# Patient Record
Sex: FEMALE | URBAN - METROPOLITAN AREA
[De-identification: names, ages, dates, MRNs, and addresses within clinical notes are randomized per-mention and may not be internally consistent; named-entity substitution may affect disease eponyms.]

---

## 2018-05-11 ENCOUNTER — APPOINTMENT (RX ONLY)
Dept: URBAN - METROPOLITAN AREA CLINIC 23 | Facility: CLINIC | Age: 48
Setting detail: DERMATOLOGY
End: 2018-05-11

## 2018-05-11 DIAGNOSIS — Z41.9 ENCOUNTER FOR PROCEDURE FOR PURPOSES OTHER THAN REMEDYING HEALTH STATE, UNSPECIFIED: ICD-10-CM

## 2018-05-11 PROCEDURE — ? MICRONEEDLING

## 2018-05-11 ASSESSMENT — LOCATION ZONE DERM: LOCATION ZONE: FACE

## 2018-05-11 ASSESSMENT — LOCATION DETAILED DESCRIPTION DERM: LOCATION DETAILED: LEFT MEDIAL MALAR CHEEK

## 2018-05-11 ASSESSMENT — LOCATION SIMPLE DESCRIPTION DERM: LOCATION SIMPLE: LEFT CHEEK

## 2018-05-11 NOTE — PROCEDURE: MICRONEEDLING
Post-Care Instructions: After the procedure, take precautions agains sun exposure. Do not apply sunscreen for 12 hours after the procedure. Do not apply make-up for 12 hours after the procedure. Avoid alcohol based toners for 10-14 days. After 2-3 days patients can return to their regular skin regimen.
Depth In Mm: 0.1
Treatment Number (Optional): 1
Length Of Topical Anesthesia Application (Optional): 15 minutes
Detail Level: Zone
Location #1: face
Depth In Mm: 0.2
Consent: Written consent obtained, risks reviewed including but not limited to pain, scarring, infection and incomplete improvement. Patient understands the procedure is cosmetic in nature and will require out of pocket payment.
Topical Anesthesia?: 15% lidocaine, 5% prilocaine

## 2018-05-31 ENCOUNTER — APPOINTMENT (RX ONLY)
Dept: URBAN - METROPOLITAN AREA CLINIC 23 | Facility: CLINIC | Age: 48
Setting detail: DERMATOLOGY
End: 2018-05-31

## 2018-05-31 DIAGNOSIS — Z41.9 ENCOUNTER FOR PROCEDURE FOR PURPOSES OTHER THAN REMEDYING HEALTH STATE, UNSPECIFIED: ICD-10-CM

## 2018-05-31 PROCEDURE — ? RECOMMENDATIONS

## 2018-05-31 PROCEDURE — ? PRESCRIPTION

## 2018-05-31 PROCEDURE — ? MICRONEEDLING

## 2018-05-31 RX ORDER — OXYMETAZOLINE HYDROCHLORIDE 10 MG/G
CREAM TOPICAL
Qty: 1 | Refills: 0 | Status: ERX | COMMUNITY
Start: 2018-05-31

## 2018-05-31 RX ORDER — AZELAIC ACID 0.15 G/G
AEROSOL, FOAM TOPICAL
Qty: 1 | Refills: 2 | Status: ERX | COMMUNITY
Start: 2018-05-31

## 2018-05-31 RX ADMIN — AZELAIC ACID 1: 0.15 AEROSOL, FOAM TOPICAL at 00:00

## 2018-05-31 RX ADMIN — OXYMETAZOLINE HYDROCHLORIDE 1: 10 CREAM TOPICAL at 00:00

## 2018-05-31 NOTE — PROCEDURE: RECOMMENDATIONS
Recommendations (Free Text): Finacea foam and Rofade
Detail Level: Zone
Recommendation Preamble: Recommended

## 2018-05-31 NOTE — HPI: COSMETIC (MICRONEEDLING)
Have You Had Microneedling Treatments Before?: has had a previous microneedling treatment
Additional History: Creamed patient with Lido/tetra 23/7%@ 9:45AM

## 2018-05-31 NOTE — PROCEDURE: MICRONEEDLING
Detail Level: Zone
Treatment Number (Optional): 0
Depth In Mm: 0.2
Depth In Mm: 0.1
Infusions (Optional): growth factor
Post-Care Instructions: After the procedure, take precautions agains sun exposure. Do not apply sunscreen for 12 hours after the procedure. Do not apply make-up for 12 hours after the procedure. Avoid alcohol based toners for 10-14 days. After 2-3 days patients can return to their regular skin regimen.
Location #1: full face
Consent: Written consent obtained, risks reviewed including but not limited to pain, scarring, infection and incomplete improvement. Patient understands the procedure is cosmetic in nature and will require out of pocket payment.

## 2018-10-18 ENCOUNTER — APPOINTMENT (RX ONLY)
Dept: URBAN - METROPOLITAN AREA CLINIC 23 | Facility: CLINIC | Age: 48
Setting detail: DERMATOLOGY
End: 2018-10-18

## 2018-10-18 DIAGNOSIS — Z41.9 ENCOUNTER FOR PROCEDURE FOR PURPOSES OTHER THAN REMEDYING HEALTH STATE, UNSPECIFIED: ICD-10-CM

## 2018-10-18 PROCEDURE — ? MICRONEEDLING

## 2018-10-18 NOTE — PROCEDURE: MICRONEEDLING
Depth In Mm: 0.1
Infusions (Optional): growth factor
Detail Level: Zone
Depth In Mm: 0.25
Post-Care Instructions: After the procedure, take precautions agains sun exposure. Do not apply sunscreen for 12 hours after the procedure. Do not apply make-up for 12 hours after the procedure. Avoid alcohol based toners for 10-14 days. After 2-3 days patients can return to their regular skin regimen.
Treatment Number (Optional): 0
Consent: Written consent obtained, risks reviewed including but not limited to pain, scarring, infection and incomplete improvement. Patient understands the procedure is cosmetic in nature and will require out of pocket payment.
78

## 2018-11-08 ENCOUNTER — APPOINTMENT (RX ONLY)
Dept: URBAN - METROPOLITAN AREA CLINIC 23 | Facility: CLINIC | Age: 48
Setting detail: DERMATOLOGY
End: 2018-11-08

## 2018-11-08 DIAGNOSIS — Z41.9 ENCOUNTER FOR PROCEDURE FOR PURPOSES OTHER THAN REMEDYING HEALTH STATE, UNSPECIFIED: ICD-10-CM

## 2018-11-08 PROCEDURE — ? FILLERS

## 2018-11-08 PROCEDURE — ? DYSPORT

## 2018-11-08 NOTE — PROCEDURE: DYSPORT
Nasal Root Units: 0
Dilution (U/ 0.1cc): 1.5
Additional Area 4 Location: [facility]
Detail Level: Detailed
Additional Area 1 Location: crows feet
Additional Area 1 Units: [address] [facility]
Additional Area 2 Location: high forehead 2.1 cm above brows
Additional Comments: special
Consent: Written consent obtained. Risks include but not limited to lid/brow ptosis, bruising, swelling, diplopia, temporary effect, incomplete chemical denervation.
Post-Care Instructions: Patient instructed to not lie down for 4 hours, limit physical activity for 24 hours and avoid manipulation of the treated areas.
Expiration Date (Month Year): 12/31/2018
Lot #: [id number]
Additional Area 3 Location: Neckbands
Expiration Date (Month Year): 06/30/2019
Lot #: [id number]
Additional Area 3 Location: lateral brows
Additional Area 3 Units: 10
Glabellar Complex Units: [address]
Additional Area 4 Location: [facility]
Forehead Units: [address]

## 2018-11-08 NOTE — PROCEDURE: FILLERS
Additional Area 4 Location: right tear though
Anesthesia Volume In Cc: 0.5
Jawline Filler Volume In Cc: 0
Additional Area 1 Location: lateral jawline, lateral cheeks, marionette lines.
Consent: Written consent obtained. Risks include but not limited to bruising, beading, irregular texture, ulceration, infection, allergic reaction, scar formation, incomplete augmentation, temporary nature, procedural pain.
Map Statment: See [address] for Complete Details
Use Map Statement For Sites (Optional): No
Include Cannula Length?: 1.5 inch
Expiration Date (Month Year): 10/29/2019
Cheeks Filler Volume In Cc: 1
Additional Area 2 Location: lateral jawline, Left marionette
Include Cannula Brand?: DermaSculpt
Price (Use Numbers Only, No Special Characters Or $): 0.00
Additional Area 3 Location: lateral jaw
Additional Area 1 Location: upper lips and using the cannula to tear troughs
Additional Area 2 Location: cheeks and vermilion boarder
Include Cannula Size?: 25G
Anesthesia Type: 1% lidocaine with epinephrine
Lot #: [id number]
Lot #: [id number]
Detail Level: Zone
Filler: Voluma
Post-Care Instructions: Patient instructed to apply ice to reduce swelling.
Additional Anesthesia Volume In Cc: 6
Additional Area 3 Location: jawline
Expiration Date (Month Year): 12/19/2019
Additional Area 5 Location: chin

## 2018-11-15 ENCOUNTER — APPOINTMENT (RX ONLY)
Dept: URBAN - METROPOLITAN AREA CLINIC 23 | Facility: CLINIC | Age: 48
Setting detail: DERMATOLOGY
End: 2018-11-15

## 2018-11-15 DIAGNOSIS — Z41.9 ENCOUNTER FOR PROCEDURE FOR PURPOSES OTHER THAN REMEDYING HEALTH STATE, UNSPECIFIED: ICD-10-CM

## 2018-11-15 PROCEDURE — ? FILLERS

## 2018-11-15 PROCEDURE — ? HYALURONIDASE INJECTION

## 2018-11-15 PROCEDURE — ? DIAGNOSIS COMMENT

## 2018-11-15 PROCEDURE — ? PULSED-DYE LASER

## 2018-11-15 PROCEDURE — ? BOTOX

## 2018-11-15 ASSESSMENT — LOCATION ZONE DERM: LOCATION ZONE: FACE

## 2018-11-15 ASSESSMENT — LOCATION SIMPLE DESCRIPTION DERM: LOCATION SIMPLE: RIGHT CHEEK

## 2018-11-15 ASSESSMENT — LOCATION DETAILED DESCRIPTION DERM: LOCATION DETAILED: RIGHT SUPERIOR LATERAL MALAR CHEEK

## 2018-11-15 NOTE — PROCEDURE: PULSED-DYE LASER
Delay Time (Ms): [address] [facility]
Spot Size: 7 mm
Delay Time (Ms): 20
Fluence In J/Cm2 (Optional): 6.50
Cryogen Time (Ms): [address] [address]
Location Override: chin
Cryogen Time (Ms): 30
Consent: Written consent obtained, risks reviewed including but not limited to crusting, scabbing, blistering, scarring, darker or lighter pigmentary change, incidental hair removal, bruising, and/or incomplete removal.
Pulse Duration: 6 ms
Pulse Duration: 10 ms
Treated Area: small area
Pulse Duration: 40 ms
Post-Procedure Care: Vaseline and ice applied.  Pos
Spot Size: 10x3 mm
Post-Care Instructions: I reviewed with the patient in detail post-care instructions. Patient should stay away from the sun and wear sun protection until treated areas are fully healed.
Spot Size: 10 mm
Detail Level: Zone
Laser Type: Vbeam 595nm

## 2018-11-15 NOTE — PROCEDURE: HYALURONIDASE INJECTION
Administered By (Optional): Dr. Yael Bolden
Filler Previously Used (Optional): Restylane
Expiration Date (Optional): 05/2020
Hyaluronidase Preparation: hyaluronidase 150 USP units/ml
Total Volume (Ccs): 0.6
Treatment Number (Optional): 1
Lot # (Optional): [id number]
Consent: The risks of contour defects and dimpling of the skin were reviewed with the patient prior to the injection.
Detail Level: Simple

## 2018-11-15 NOTE — PROCEDURE: FILLERS
Additional Area 4 Volume In Cc: 0
Filler: Restylane
Include Cannula Information In Note?: No
Additional Area 1 Location: upper lips and using the cannula to tear troughs
Additional Area 2 Location: lower lip
Additional Area 3 Volume In Cc: 1
Consent: Written consent obtained. Risks include but not limited to bruising, beading, irregular texture, ulceration, infection, allergic reaction, scar formation, incomplete augmentation, temporary nature, procedural pain.
Include Cannula Brand?: DermaSculpt
Include Cannula Length?: 1.5 inch
Anesthesia Type: 1% lidocaine with epinephrine
Additional Area 3 Location: chin,cheeks
Additional Area 2 Location: lateral jawline, Left marionette
Price (Use Numbers Only, No Special Characters Or $): 0.00
Lot #: 
Lot #: [id number]
Additional Area 1 Location: Nasalabial folds
Additional Anesthesia Volume In Cc: 6
Expiration Date (Month Year): 01/31/2021
Additional Area 3 Location: lateral jaw
Map Statment: See [address] for Complete Details
Include Cannula Size?: 25G
Detail Level: Zone
Anesthesia Volume In Cc: 0.5
Expiration Date (Month Year): 06/2020
Post-Care Instructions: Patient instructed to apply ice to reduce swelling.
Additional Area 5 Location: chin
Additional Area 4 Location: right tear though

## 2018-11-15 NOTE — PROCEDURE: BOTOX
Additional Area 2 Location: high forehead
Glabellar Complex Units: 0
Additional Area 6 Location: right lower lip
Detail Level: Zone
Expiration Date (Month Year): 12/2020
Post-Care Instructions: Patient instructed to not lie down for 4 hours and limit physical activity for 24 hours. Patient instructed not to travel by airplane for 48 hours.
Additional Area 3 Units: 1
Additional Area 1 Location: Crows feet
Additional Area 4 Location: neck bands
Dilution (U/0.1 Cc): 2.5
Consent: Written consent obtained. Risks include but not limited to lid/brow ptosis, bruising, swelling, diplopia, temporary effect, incomplete chemical denervation.
Additional Area 3 Location: left  Eyebrow
Lot #: [id number]
Additional Area 5 Location: upper lip cutaneous

## 2018-11-16 ENCOUNTER — APPOINTMENT (RX ONLY)
Dept: URBAN - METROPOLITAN AREA CLINIC 23 | Facility: CLINIC | Age: 48
Setting detail: DERMATOLOGY
End: 2018-11-16

## 2018-11-16 DIAGNOSIS — Z41.9 ENCOUNTER FOR PROCEDURE FOR PURPOSES OTHER THAN REMEDYING HEALTH STATE, UNSPECIFIED: ICD-10-CM

## 2018-11-16 PROCEDURE — ? PULSED-DYE LASER

## 2018-11-16 PROCEDURE — ? IN-HOUSE DISPENSING PHARMACY

## 2018-11-16 PROCEDURE — ? DIAGNOSIS COMMENT

## 2018-11-16 NOTE — PROCEDURE: PULSED-DYE LASER
Spot Size: 7 mm
Pulse Duration: 10 ms
Delay Time (Ms): [address] [facility]
Treated Area: small area
Cryogen Time (Ms): 27
Laser Type: Vbeam 595nm
Delay Time (Ms): 20
Consent: Written consent obtained, risks reviewed including but not limited to crusting, scabbing, blistering, scarring, darker or lighter pigmentary change, incidental hair removal, bruising, and/or incomplete removal.
Spot Size: 10x3 mm
Cryogen Time (Ms): [address] [address]
Fluence In J/Cm2 (Optional): 6.50
Location Override: chin
Pulse Duration: 40 ms
Cryogen Time (Ms): 30
Detail Level: Zone
Post-Procedure Care: Vaseline and ice applied.  Pos
Post-Care Instructions: I reviewed with the patient in detail post-care instructions. Patient should stay away from the sun and wear sun protection until treated areas are fully healed.
Pulse Duration: 6 ms
Spot Size: 10 mm

## 2018-11-16 NOTE — PROCEDURE: IN-HOUSE DISPENSING PHARMACY
Product 5 Units Dispensed: 0
Product 8 Unit Type: ml
Product 52 Unit Type: mg
Product 6 Refills: 1
Name Of Product 8: Prednisone 20mg
Name Of Product 5: Hydroquinone 4% / Tretinoin 0.05% / Fluocinolone 0.01%
Name Of Product 9: Prednisone 10mg
Name Of Product 6: Tretinoin 0.05% cream
Product 12 Application Directions: Apply to the entire face in the Am and Pm
Product 10 Amount/Unit (Numbers Only): 10
Product 4 Application Directions: Take one tablet Day # 2 and one tablet day # 3 as indicated and resume.
Name Of Product 12: Skin Better Even tone Serum
Product 5 Unit Type: tube(s)
Product 3 Application Directions: Take 1 tablet today for swelling as indicated today in office.  Take another one tomorrow
Render Refills If Set To 0: No
Product 9 Amount/Unit (Numbers Only): 2
Product 7 Unit Type: bottle(s)
Product 7 Application Directions: Apply to each eye lashes at bedtime
Name Of Product 10: Cetirizine HCL- Antihistamine
Product 1 Amount/Unit (Numbers Only): 5
Product 6 Amount/Unit (Numbers Only): [address] [facility] [place]
Product 5 Application Directions: Apply thin layer to skin face area at bedtime every other day
Product 2 Unit Type: jar(s)
Name Of Product 1: Valium
Name Of Product 11: Glycolic acid pads
Product 10 Application Directions: Take one tablet now in the office
Product 8 Application Directions: Apply to eyelashes nightly
Name Of Product 4: Prednisone 10 mg
Product 3 Unit Type: tablets
Product 8 Amount/Unit (Numbers Only): 3
Detail Level: Zone
Product 1 Price/Unit (In Dollars): 0.00
Product 6 Application Directions: Apply pea size amount to entire face at bedtime only
Name Of Product 2: Hydroquinone pads 6%
Product 9 Application Directions: Take 1 tablet now in office
Product 6 Unit Type: grams
Name Of Product 7: Latisse 5ml
Product 2 Application Directions: Apply 1 pad to face in the AM

## 2019-02-06 ENCOUNTER — APPOINTMENT (RX ONLY)
Dept: URBAN - METROPOLITAN AREA CLINIC 23 | Facility: CLINIC | Age: 49
Setting detail: DERMATOLOGY
End: 2019-02-06

## 2019-02-06 DIAGNOSIS — Z41.9 ENCOUNTER FOR PROCEDURE FOR PURPOSES OTHER THAN REMEDYING HEALTH STATE, UNSPECIFIED: ICD-10-CM

## 2019-02-06 PROCEDURE — ? IN-HOUSE DISPENSING PHARMACY

## 2019-02-06 PROCEDURE — ? DYSPORT

## 2019-02-06 PROCEDURE — ? PULSED-DYE LASER

## 2019-02-06 PROCEDURE — ? FILLERS

## 2019-02-06 NOTE — PROCEDURE: DYSPORT
Glabellar Complex Units: 0
Additional Area 2 Location: lateral brows
Consent: Written consent obtained. Risks include but not limited to lid/brow ptosis, bruising, swelling, diplopia, temporary effect, incomplete chemical denervation.
Additional Area 5 Location: chin
Lot #: [id number]
Expiration Date (Month Year): 08/31/2019
Additional Area 1 Location: forehead 2cm above brows
Detail Level: Detailed
Post-Care Instructions: Patient instructed to not lie down for 4 hours, limit physical activity for 24 hours and avoid manipulation of the treated areas.
Additional Area 6 Location: right axilla left axilla
Forehead Units: [address] [address]
Dilution (U/ 0.1cc): 1.5

## 2019-02-06 NOTE — PROCEDURE: FILLERS
Decollete Filler  Volume In Cc: 0
Additional Anesthesia Volume In Cc: 6
Include Cannula Brand?: DermaSculpt
Lot #: [id number]
Additional Area 1 Location: cheeks,temples,medial cheeks
Use Map Statement For Sites (Optional): No
Additional Area 1 Volume In Cc: 1
Include Cannula Size?: 25G
Expiration Date (Month Year): 05/31/2021
Additional Area 2 Location: NLF, Lips
Map Statment: See [address] for Complete Details
Lot #: [id number]
Include Cannula Length?: 1.5 inch
Additional Area 3 Location: Temples with a cannula
Consent: Written consent obtained. Risks include but not limited to bruising, beading, irregular texture, ulceration, infection, allergic reaction, scar formation, incomplete augmentation, temporary nature, procedural pain.
Post-Care Instructions: Patient instructed to apply ice to reduce swelling.
Filler: Jolynn German
Lot #: [id number]
Additional Area 4 Location: lateral Temples with Cannula, T
Include Cannula Information In Note?: Yes
Additional Area 1 Location: chin
Additional Area 5 Location: marionettes, perioral lines
Additional Area 1 Location: left dorsal hand using an acannula
Additional Area 2 Location: lateral jawline, Left marionette
Detail Level: Zone
Anesthesia Type: 1% lidocaine with epinephrine
Lot #: [id number]
Additional Area 1 Location: left dorsal hand
Additional Area 3 Location: lateral jaw
Anesthesia Volume In Cc: 0.5
Expiration Date (Month Year): 2021-05-31
Additional Area 4 Location: Left Cheek, Left nasalabial fold
Price (Use Numbers Only, No Special Characters Or $): 0.00

## 2019-02-06 NOTE — PROCEDURE: IN-HOUSE DISPENSING PHARMACY
Name Of Product 6: Tretinoin 0.05% cream
Product 11 Units Dispensed: 0
Product 63 Unit Type: mg
Product 8 Unit Type: ml
Product 12 Amount/Unit (Numbers Only): 1
Name Of Product 14: Valtrex 500mg
Product 11 Application Directions: Apply to the affected area of the face daily
Name Of Product 3: Prednisone 20 mg
Name Of Product 9: Prednisone 10mg
Product 2 Unit Type: jar(s)
Product 6 Application Directions: Apply pea size amount to entire face at bedtime only
Name Of Product 1: Valtrex
Product 3 Unit Type: tablets
Product 5 Application Directions: Apply thin layer to skin face area at bedtime every other day
Product 14 Amount/Unit (Numbers Only): [address]
Product 13 Application Directions: Wash the face in the AM and PM
Product 9 Amount/Unit (Numbers Only): 2
Name Of Product 11: Exfoliate Glycolic pads
Product 8 Application Directions: Apply to eyelashes nightly
Product 3 Application Directions: Take 1 tablet today for swelling as indicated today in office.
Product 5 Unit Type: tube(s)
Product 13 Unit Type: bottle(s)
Name Of Product 10: Cetirizine HCL- Antihistamine
Product 4 Application Directions: Take 1 tablet tomorrow for swelling as indicated.
Product 7 Application Directions: Apply to each eye lashes at bedtime
Send Charges To Patient Encounter: No
Name Of Product 5: Hydroquinone 4% / Tretinoin 0.05% / Fluocinolone 0.01%
Name Of Product 13: Exfoliate Glycolic Cleanser
Product 10 Application Directions: Take one tablet now in the office
Name Of Product 8: Prednisone 20mg
Product 6 Amount/Unit (Numbers Only): [address] [facility] [place]
Product 10 Amount/Unit (Numbers Only): 10
Product 14 Application Directions: Take one tablet in the Am and Pm x 3 days
Product 6 Unit Type: grams
Name Of Product 12: Skin Better Even tone Serum
Product 2 Application Directions: Apply pad to face in the am and pm as indicated
Product 9 Application Directions: Take 1 tablet now in office
Name Of Product 7: Latisse 5ml
Product 1 Price/Unit (In Dollars): 0.00
Product 8 Amount/Unit (Numbers Only): 3
Name Of Product 2: Hydroquinone pads 6%
Product 12 Application Directions: Apply to the entire face in the Am and Pm
Detail Level: Zone

## 2019-02-06 NOTE — PROCEDURE: PULSED-DYE LASER
Fluence In J/Cm2 (Optional): 11.00
Spot Size: 10 mm
Delay Time (Ms): [address] [facility], [place]
Cryogen Time (Ms): [address] [address]
Pulse Duration: 6 ms
Post-Care Instructions: I reviewed with the patient in detail post-care instructions. Patient should stay away from the sun and wear sun protection until treated areas are fully healed.
Location Override: bruising post cos.
Pulse Duration: 10 ms
Cryogen Time (Ms): 30
Delay Time (Ms): 20
Cryogen Time (Ms): [address]
Laser Type: Vbeam 595nm
Spot Size: 7 mm
Consent: Written consent obtained, risks reviewed including but not limited to crusting, scabbing, blistering, scarring, darker or lighter pigmentary change, incidental hair removal, bruising, and/or incomplete removal.
Treated Area: large area
Treated Area: small area
Fluence In J/Cm2 (Optional): 6.50
Detail Level: Zone
Delay Time (Ms): 40

## 2019-02-06 NOTE — PROCEDURE: MIPS QUALITY
Quality 110: Preventive Care And Screening: Influenza Immunization: Influenza Immunization not Administered for Documented Reasons.
Quality 474: Zoster Vaccination Status: Shingrix Vaccination not Administered or Previously Received, Reason not Otherwise Specified
Detail Level: Simple
Quality 130: Documentation Of Current Medications In The Medical Record: Current Medications Documented

## 2019-02-21 ENCOUNTER — RX ONLY (OUTPATIENT)
Age: 49
Setting detail: RX ONLY
End: 2019-02-21

## 2019-02-21 ENCOUNTER — APPOINTMENT (RX ONLY)
Dept: URBAN - METROPOLITAN AREA CLINIC 23 | Facility: CLINIC | Age: 49
Setting detail: DERMATOLOGY
End: 2019-02-21

## 2019-02-21 DIAGNOSIS — Z41.9 ENCOUNTER FOR PROCEDURE FOR PURPOSES OTHER THAN REMEDYING HEALTH STATE, UNSPECIFIED: ICD-10-CM

## 2019-02-21 PROCEDURE — ? DYSPORT

## 2019-02-21 RX ORDER — PREDNISONE 10 MG/1
TABLET ORAL
Qty: 6 | Refills: 1 | Status: ERX | COMMUNITY
Start: 2019-02-21

## 2019-02-21 NOTE — PROCEDURE: DYSPORT
Additional Area 1 Units: 0
Detail Level: Detailed
Additional Area 4 Location: high mid forehead
Lot #: [id number]
Expiration Date (Month Year): 08/31/2019
Post-Care Instructions: Patient instructed to not lie down for 4 hours, limit physical activity for 24 hours and avoid manipulation of the treated areas.
Additional Area 3 Location: lateral brows
Additional Area 6 Location: right axilla left axilla
Dilution (U/ 0.1cc): 1.5
Additional Area 2 Location: chin
Additional Area 5 Location: Crows feet
Periorbital Skin Units: 10
Additional Area 1 Location: Glabella touch up
Consent: Written consent obtained. Risks include but not limited to lid/brow ptosis, bruising, swelling, diplopia, temporary effect, incomplete chemical denervation.

## 2019-02-26 ENCOUNTER — APPOINTMENT (RX ONLY)
Dept: URBAN - METROPOLITAN AREA CLINIC 23 | Facility: CLINIC | Age: 49
Setting detail: DERMATOLOGY
End: 2019-02-26

## 2019-02-26 DIAGNOSIS — Z41.9 ENCOUNTER FOR PROCEDURE FOR PURPOSES OTHER THAN REMEDYING HEALTH STATE, UNSPECIFIED: ICD-10-CM

## 2019-02-26 PROCEDURE — ? HYALURONIDASE INJECTION

## 2019-02-26 PROCEDURE — ? DIAGNOSIS COMMENT

## 2019-02-26 PROCEDURE — ? PULSED-DYE LASER

## 2019-02-26 PROCEDURE — ? FILLERS

## 2019-02-26 ASSESSMENT — LOCATION DETAILED DESCRIPTION DERM: LOCATION DETAILED: LEFT CHIN

## 2019-02-26 ASSESSMENT — LOCATION SIMPLE DESCRIPTION DERM: LOCATION SIMPLE: CHIN

## 2019-02-26 ASSESSMENT — LOCATION ZONE DERM: LOCATION ZONE: FACE

## 2019-02-26 NOTE — PROCEDURE: FILLERS
Lateral Face Filler  Volume In Cc: 0
Include Cannula Information In Note?: No
Additional Area 5 Location: NFs, lateral cheeks
Additional Area 4 Location: Left Cheek, Left nasalabial fold
Filler: Restylane Defyne
Lot #: [id number]
Expiration Date (Month Year): 07/31/2020
Lot #: [id number]
Detail Level: Zone
Lot #: [id number]
Expiration Date (Month Year): 6/4/20
Include Cannula Brand?: DermaSculpt
Anesthesia Type: 1% lidocaine with epinephrine
Price (Use Numbers Only, No Special Characters Or $): 0.00
Expiration Date (Month Year): 05/31/2021
Include Cannula Size?: 25G
Include Cannula Information In Note?: Yes
Additional Area 1 Location: cheeks and vermillion lips
Anesthesia Volume In Cc: 0.5
Consent: Written consent obtained. Risks include but not limited to bruising, beading, irregular texture, ulceration, infection, allergic reaction, scar formation, incomplete augmentation, temporary nature, procedural pain.
Additional Area 2 Location: Chin
Include Cannula Length?: 1.5 inch
Post-Care Instructions: Patient instructed to apply ice to reduce swelling.
Additional Area 1 Location: Oral commissure, marionette lines, lips
Additional Area 2 Volume In Cc: 1
Lot #: [id number]
Additional Anesthesia Volume In Cc: 6
Additional Area 3 Location: right hand
Additional Area 2 Location: lateral jawline, Left marionette
Additional Area 1 Location: left dorsal hand using an acannula
Map Statment: See [address] for Complete Details
Additional Area 4 Location: left hand
Additional Area 3 Location: lateral jaw
Additional Area 1 Location: left dorsal hand

## 2019-02-26 NOTE — PROCEDURE: HYALURONIDASE INJECTION
Total Volume (Ccs): 0.5
Detail Level: Simple
Treatment Number (Optional): 1
Lot # (Optional): [id number]
Expiration Date (Optional): 05/2020
Administered By (Optional): Dr. Obi Kunz
Consent: The risks of contour defects and dimpling of the skin were reviewed with the patient prior to the injection.
Hyaluronidase Preparation: hyaluronidase 150 USP units/ml

## 2019-02-26 NOTE — PROCEDURE: PULSED-DYE LASER
Cryogen Time (Ms): [address] [address]
Detail Level: Zone
Treated Area: medium area
Spot Size: 10 mm
Delay Time (Ms): [address] [facility]
Delay Time (Ms): [address]
Spot Size: 7 mm
Cryogen Time (Ms): 40
Pulse Duration: 10 ms
Location (Required For Details To Render In Note But Body Touch Will Also Count For First Location): chin
Consent: Written consent obtained, risks reviewed including but not limited to crusting, scabbing, blistering, scarring, darker or lighter pigmentary change, incidental hair removal, bruising, and/or incomplete removal.
Treated Area: large area
Cryogen Time (Ms): 30
Fluence In J/Cm2 (Optional): 6.50
Delay Time (Ms): 20
Pulse Duration: 6 ms
Laser Type: Vbeam 595nm
Post-Care Instructions: I reviewed with the patient in detail post-care instructions. Patient should stay away from the sun and wear sun protection until treated areas are fully healed.

## 2019-04-18 ENCOUNTER — APPOINTMENT (RX ONLY)
Dept: URBAN - METROPOLITAN AREA CLINIC 23 | Facility: CLINIC | Age: 49
Setting detail: DERMATOLOGY
End: 2019-04-18

## 2019-04-18 DIAGNOSIS — Z41.9 ENCOUNTER FOR PROCEDURE FOR PURPOSES OTHER THAN REMEDYING HEALTH STATE, UNSPECIFIED: ICD-10-CM

## 2019-04-18 DIAGNOSIS — D485 NEOPLASM OF UNCERTAIN BEHAVIOR OF SKIN: ICD-10-CM

## 2019-04-18 PROBLEM — D48.5 NEOPLASM OF UNCERTAIN BEHAVIOR OF SKIN: Status: ACTIVE | Noted: 2019-04-18

## 2019-04-18 PROCEDURE — ? ADDITIONAL NOTES

## 2019-04-18 PROCEDURE — ? BOTOX

## 2019-04-18 PROCEDURE — ? BIOPSY BY SHAVE METHOD

## 2019-04-18 PROCEDURE — 11102 TANGNTL BX SKIN SINGLE LES: CPT

## 2019-04-18 ASSESSMENT — LOCATION DETAILED DESCRIPTION DERM: LOCATION DETAILED: LEFT MEDIAL SUPERIOR CHEST

## 2019-04-18 ASSESSMENT — LOCATION SIMPLE DESCRIPTION DERM: LOCATION SIMPLE: CHEST

## 2019-04-18 ASSESSMENT — LOCATION ZONE DERM: LOCATION ZONE: TRUNK

## 2019-04-18 NOTE — PROCEDURE: BOTOX
Expiration Date (Month Year): 08/2021
Levator Labii Superioris Units: 0
Additional Area 6 Location: [facility]
Additional Area 4 Location: bunnies lines
Additional Area 1 Units: 4
Additional Area 1 Location: chin
Lot #: [id number]
Dilution (U/0.1 Cc): 2.5
Detail Level: Zone
Additional Area 2 Location: Lateral brows
Additional Area 3 Location: neck bands
Additional Area 5 Location: crows feet
Consent: Written consent obtained. Risks include but not limited to lid/brow ptosis, bruising, swelling, diplopia, temporary effect, incomplete chemical denervation.
Post-Care Instructions: Patient instructed to not lie down for 4 hours and limit physical activity for 24 hours. Patient instructed not to travel by airplane for 48 hours.

## 2019-04-18 NOTE — PROCEDURE: BIOPSY BY SHAVE METHOD
Notification Instructions: Patient will be notified of biopsy results. However, patient instructed to call the office if not contacted within 2 weeks.
Biopsy Method: 15 blade
Lab: [id number] [facility]
Cryotherapy Text: The wound bed was treated with cryotherapy after the biopsy was performed.
Was A Bandage Applied: Yes
Wound Care: Vaseline
Size Of Lesion In Cm: 1
Render Post-Care Instructions In Note?: no
Type Of Destruction Used: Curettage
Electrodesiccation Text: The wound bed was treated with electrodesiccation after the biopsy was performed.
Consent: The provider's intent is to obtain a tissue sample solely for diagnostic purposes. Written consent to obtain tissue sample was obtained and risks were reviewed including but not limited to scarring, infection, bleeding, scabbing, incomplete removal, nerve damage and allergy to anesthesia.
Anesthesia Volume In Cc (Will Not Render If 0): 0.2
Biopsy Type: H and E
Hemostasis: Electrocautery
Depth Of Biopsy: dermis
X Size Of Lesion In Cm: 0
Electrodesiccation And Curettage Text: The wound bed was treated with electrodesiccation and curettage after the biopsy was performed.
Dressing: bandage
Detail Level: Detailed
Curettage Text: The wound bed was treated with curettage after the biopsy was performed.
Post-Care Instructions: I reviewed with the patient in detail post-care instructions. Patient is to keep the biopsy site dry overnight, and then apply bacitracin twice daily until healed. Patient may apply hydrogen peroxide soaks to remove any crusting.
Silver Nitrate Text: The wound bed was treated with silver nitrate after the biopsy was performed.
Anesthesia Type: 1% Xylocaine with [id number] epinephrine and sodium bicarbonate
Billing Type: [employer]

## 2019-04-25 ENCOUNTER — RX ONLY (OUTPATIENT)
Age: 49
Setting detail: RX ONLY
End: 2019-04-25

## 2019-04-25 RX ORDER — IMIQUIMOD 50 MG/G
CREAM TOPICAL
Qty: 1 | Refills: 1 | Status: ERX | COMMUNITY
Start: 2019-04-25

## 2019-05-23 ENCOUNTER — APPOINTMENT (RX ONLY)
Dept: URBAN - METROPOLITAN AREA CLINIC 23 | Facility: CLINIC | Age: 49
Setting detail: DERMATOLOGY
End: 2019-05-23

## 2019-05-23 DIAGNOSIS — Z41.9 ENCOUNTER FOR PROCEDURE FOR PURPOSES OTHER THAN REMEDYING HEALTH STATE, UNSPECIFIED: ICD-10-CM

## 2019-05-23 DIAGNOSIS — Z48.817 ENCOUNTER FOR SURGICAL AFTERCARE FOLLOWING SURGERY ON THE SKIN AND SUBCUTANEOUS TISSUE: ICD-10-CM

## 2019-05-23 PROCEDURE — ? ADDITIONAL NOTES

## 2019-05-23 PROCEDURE — 99212 OFFICE O/P EST SF 10 MIN: CPT | Mod: NC

## 2019-05-23 PROCEDURE — ? DIAGNOSIS COMMENT

## 2019-05-23 ASSESSMENT — LOCATION SIMPLE DESCRIPTION DERM: LOCATION SIMPLE: CHEST

## 2019-05-23 ASSESSMENT — LOCATION ZONE DERM: LOCATION ZONE: TRUNK

## 2019-05-23 ASSESSMENT — LOCATION DETAILED DESCRIPTION DERM: LOCATION DETAILED: LEFT LATERAL SUPERIOR CHEST

## 2019-05-23 NOTE — PROCEDURE: REASSURANCE
Hide Additional Notes?: No
Additional Notes (Optional): **Patient advised to continue Aldara cream as directed to complete the 6 weeks course
Detail Level: Detailed

## 2019-05-23 NOTE — PROCEDURE: DIAGNOSIS COMMENT
Detail Level: Simple
Comment: S/p Aldara cream 5 times a week for 4 weeks. Good response. Continue QHS Mon-Fri for a total of 6 weeks.

## 2019-08-07 ENCOUNTER — RX ONLY (OUTPATIENT)
Age: 49
Setting detail: RX ONLY
End: 2019-08-07

## 2019-08-07 RX ORDER — CEPHALEXIN 500 MG/1
CAPSULE ORAL BID
Qty: 14 | Refills: 0 | Status: ERX | COMMUNITY
Start: 2019-08-07

## 2019-08-07 RX ORDER — TOBRAMYCIN AND DEXAMETHASONE 3; 1 MG/G; MG/G
OINTMENT OPHTHALMIC
Qty: 1 | Refills: 1 | Status: ERX | COMMUNITY
Start: 2019-08-07

## 2019-10-15 NOTE — HPI: COSMETIC (MICRONEEDLING)
Have You Had Microneedling Treatments Before?: has not had a previous microneedling treatment
Additional History: Took valtrex this am. Has rx at home.
Pt received in bed A&A Ox2, +dysarthria, reduced cognition, pain scale 0/10 pre & post eval

## 2019-10-18 ENCOUNTER — APPOINTMENT (RX ONLY)
Dept: URBAN - METROPOLITAN AREA CLINIC 23 | Facility: CLINIC | Age: 49
Setting detail: DERMATOLOGY
End: 2019-10-18

## 2019-10-18 DIAGNOSIS — Z41.9 ENCOUNTER FOR PROCEDURE FOR PURPOSES OTHER THAN REMEDYING HEALTH STATE, UNSPECIFIED: ICD-10-CM

## 2019-10-18 PROCEDURE — ? PULSED-DYE LASER

## 2019-10-18 NOTE — PROCEDURE: PULSED-DYE LASER
Laser Type: Vbeam 595nm
Pulse Duration: 10 ms
Cryogen Time (Ms): [address]
Cryogen Time (Ms): [address] [address]
Pulse Duration: 40 ms
Cryogen Time (Ms): 30
Spot Size: 10 mm
Consent: Written consent obtained, risks reviewed including but not limited to crusting, scabbing, blistering, scarring, darker or lighter pigmentary change, incidental hair removal, bruising, and/or incomplete removal.
Treated Area: small area
Detail Level: Zone
Delay Time (Ms): [address] [facility]
Fluence In J/Cm2 (Optional): 6.50
Fluence In J/Cm2 (Optional): 10:00
Delay Time (Ms): 20
Fluence In J/Cm2 (Optional): 5:50
Delay Time (Ms): 10
Location Override: nose
Delay Time (Ms): 0
Spot Size: 7 mm
Post-Care Instructions: I reviewed with the patient in detail post-care instructions. Patient should stay away from the sun and wear sun protection until treated areas are fully healed.
Spot Size: 10x3 mm
Location Override: face

## 2020-01-20 ENCOUNTER — APPOINTMENT (RX ONLY)
Dept: URBAN - METROPOLITAN AREA CLINIC 23 | Facility: CLINIC | Age: 50
Setting detail: DERMATOLOGY
End: 2020-01-20

## 2020-01-20 DIAGNOSIS — Z41.9 ENCOUNTER FOR PROCEDURE FOR PURPOSES OTHER THAN REMEDYING HEALTH STATE, UNSPECIFIED: ICD-10-CM

## 2020-01-20 PROCEDURE — ? IN-HOUSE DISPENSING PHARMACY

## 2020-01-20 NOTE — PROCEDURE: IN-HOUSE DISPENSING PHARMACY
Product 20 Unit Type: mg
Product 12 Refills: 0
Name Of Product 1: Brightening pads 6%
Name Of Product 4: Take one table today pre procedure
Product 2 Amount/Unit (Numbers Only): 1
Send Charges To Patient Encounter: Yes
Product 3 Application Directions: Valium 5mg
Product 2 Unit Type: tube(s)
Name Of Product 3: Emi Vieyra
Detail Level: Zone
Product 1 Unit Type: jar(s)
Name Of Product 2: Tretinoin 0.05%

## 2020-02-27 ENCOUNTER — RX ONLY (OUTPATIENT)
Age: 50
Setting detail: RX ONLY
End: 2020-02-27

## 2020-02-27 ENCOUNTER — APPOINTMENT (RX ONLY)
Dept: URBAN - METROPOLITAN AREA CLINIC 23 | Facility: CLINIC | Age: 50
Setting detail: DERMATOLOGY
End: 2020-02-27

## 2020-02-27 DIAGNOSIS — Z41.9 ENCOUNTER FOR PROCEDURE FOR PURPOSES OTHER THAN REMEDYING HEALTH STATE, UNSPECIFIED: ICD-10-CM

## 2020-02-27 DIAGNOSIS — L81.4 OTHER MELANIN HYPERPIGMENTATION: ICD-10-CM

## 2020-02-27 PROCEDURE — ? ADDITIONAL NOTES

## 2020-02-27 PROCEDURE — ? PULSED-DYE LASER

## 2020-02-27 PROCEDURE — ? IN-HOUSE DISPENSING PHARMACY

## 2020-02-27 PROCEDURE — ? TCA CHEMICAL PEEL

## 2020-02-27 ASSESSMENT — LOCATION SIMPLE DESCRIPTION DERM: LOCATION SIMPLE: LEFT CHEEK

## 2020-02-27 ASSESSMENT — LOCATION ZONE DERM: LOCATION ZONE: FACE

## 2020-02-27 ASSESSMENT — LOCATION DETAILED DESCRIPTION DERM
LOCATION DETAILED: LEFT LATERAL MALAR CHEEK
LOCATION DETAILED: LEFT LATERAL BUCCAL CHEEK

## 2020-02-27 NOTE — PROCEDURE: IN-HOUSE DISPENSING PHARMACY
Product 19 Units Dispensed: 0
Product 25 Unit Type: mg
Product 13 Application Directions: Wash the face in the AM and PM
Product 5 Refills: 1
Name Of Product 4: Prednisone 20mg
Product 1 Application Directions: Apply to eyelids am and pm
Product 1 Unit Type: ml
Product 12 Refills: 2
Product 8 Application Directions: Take one tablet today one hour prior to procedure as indicated.
Name Of Product 11: Hydroquinone 6% pads
Product 6 Unit Type: grams
Product 13 Unit Type: bottle(s)
Name Of Product 14: Valtrex 500mg
Product 8 Unit Type: tablets
Name Of Product 2: Vergil Newer
Product 4 Application Directions: Take 1 tablet today in office post injections  for swelling as indicated.
Product 15 Application Directions: Take one tablet twice daily for 3-6 months
Name Of Product 6: Tretinoin 0.05% cream
Render Refills If Set To 0: No
Product 21 Application Directions: Use as directed
Product 3 Application Directions: Take 1 tablet-today and one tablets tomorrow as indicated
Name Of Product 1: Latiesse 3 ml
Name Of Product 13: Exfoliate Glycolic Cleanser
Name Of Product 8: Valium 5 mg
Product 10 Application Directions: Take two tablets with water prior to procedure.
Product 14 Amount/Unit (Numbers Only): [address]
Product 6 Application Directions: Apply pea size amount to entire face at bedtime only.
Product 21 Unit Type: kit(s)
Product 6 Amount/Unit (Numbers Only): [address] [facility]
Name Of Product 7: Latisse 5ml
Product 9 Application Directions: Apply to affected area perioral area at bedtime only as indicated.
Name Of Product 15: Viviscal PRO
Product 5 Application Directions: Take one tablet by mouth tomorrow as indicated for swelling
Product 1 Amount/Unit (Numbers Only): 3
Product 2 Unit Type: jar(s)
Detail Level: Detailed
Name Of Product 3: Prednisone 10 mg
Product 12 Application Directions: Apply to the entire face in the Am and Pm
Name Of Product 21: Defenage skincare
Product 7 Application Directions: Apply to each eye lashes at bedtime
Name Of Product 10: Valium
Product 11 Application Directions: Apply to the affected area of the body daily.
Name Of Product 9: Brightening pads 6%
Product 15 Amount/Unit (Numbers Only): [address] [facility]
Product 2 Application Directions: Apply at bedtime
Product 14 Application Directions: Take one tablet in the Am and Pm x 3 days
Product 1 Price/Unit (In Dollars): 0.00
Name Of Product 12: Skin Better Even tone Serum
Product 10 Amount/Unit (Numbers Only): 5

## 2020-02-27 NOTE — PROCEDURE: TCA CHEMICAL PEEL
Post-Care Instructions: I reviewed with the patient in detail post-care instructions. Patient should avoid sun exposure and wear sun protection.
Treatment Number: 1
Prep: The treated area was degreased with pre-peel cleanser, and vaseline was applied for protection of mucous membranes.
Time (Mins): 2
Detail Level: Detailed
Post Peel Care: After the procedure, iced cool gauze were applied to the site x 10 minutes. Area was cleansed with the obagi cleanser. Sun protection and post-care instructions were reviewed with the patient.
Consent: Prior to the procedure, written consent was obtained and risks were reviewed, including but not limited to: redness, peeling, blistering, pigmentary change, scarring, infection, and pain.
Erythema: mild
Frost (0,1+,2+,3+,4+): 3+
Chemical Peel: 30% TCA

## 2020-02-27 NOTE — PROCEDURE: PULSED-DYE LASER
Immediate Endpoint: purpura
Delay Time (Ms): [address] [facility]
Spot Size: 10 mm
Fluence In J/Cm2 (Optional): 11.00
Cryogen Time (Ms): 27
Cryogen Time (Ms): [address] [address]
Detail Level: Detailed
Location (Required For Details To Render In Note): full face
Pulse Duration: 40 ms
Post-Care Instructions: I reviewed with the patient in detail post-care instructions. Patient should stay away from the sun and wear sun protection until treated areas are fully healed.
Pulse Duration: 10 ms
Cryogen Time (Ms): 10
Spot Size: 10x3 mm
Spot Size: 7 mm
Fluence In J/Cm2 (Optional): 6.50
Delay Time (Ms): 20
Treated Area: medium area
Cryogen Time (Ms): 30
Location Override: face
Laser Type: Vbeam 595nm
Consent: Written consent obtained, risks reviewed including but not limited to crusting, scabbing, blistering, scarring, darker or lighter pigmentary change, incidental hair removal, bruising, and/or incomplete removal.
Treated Area: small area

## 2020-04-03 ENCOUNTER — RX ONLY (OUTPATIENT)
Age: 50
Setting detail: RX ONLY
End: 2020-04-03

## 2020-04-03 RX ORDER — CLOBETASOL PROPIONATE 0.5 MG/G
OINTMENT TOPICAL
Qty: 1 | Refills: 0 | Status: ERX | COMMUNITY
Start: 2020-04-03

## 2020-05-19 ENCOUNTER — RX ONLY (OUTPATIENT)
Age: 50
Setting detail: RX ONLY
End: 2020-05-19

## 2020-05-19 RX ORDER — SILVER SULFADIAZINE 1 %
CREAM (GRAM) TOPICAL
Qty: 1 | Refills: 0 | Status: ERX | COMMUNITY
Start: 2020-05-19

## 2022-02-24 ENCOUNTER — APPOINTMENT (RX ONLY)
Dept: URBAN - METROPOLITAN AREA CLINIC 23 | Facility: CLINIC | Age: 52
Setting detail: DERMATOLOGY
End: 2022-02-24

## 2022-02-24 DIAGNOSIS — L81.4 OTHER MELANIN HYPERPIGMENTATION: ICD-10-CM

## 2022-02-24 DIAGNOSIS — Z85.828 PERSONAL HISTORY OF OTHER MALIGNANT NEOPLASM OF SKIN: ICD-10-CM

## 2022-02-24 DIAGNOSIS — L82.0 INFLAMED SEBORRHEIC KERATOSIS: ICD-10-CM

## 2022-02-24 DIAGNOSIS — Z41.9 ENCOUNTER FOR PROCEDURE FOR PURPOSES OTHER THAN REMEDYING HEALTH STATE, UNSPECIFIED: ICD-10-CM

## 2022-02-24 DIAGNOSIS — L82.1 OTHER SEBORRHEIC KERATOSIS: ICD-10-CM

## 2022-02-24 PROCEDURE — 99213 OFFICE O/P EST LOW 20 MIN: CPT | Mod: 25

## 2022-02-24 PROCEDURE — 17110 DESTRUCTION B9 LES UP TO 14: CPT

## 2022-02-24 PROCEDURE — ? COUNSELING

## 2022-02-24 PROCEDURE — ? BENIGN DESTRUCTION

## 2022-02-24 PROCEDURE — ? LIQUID NITROGEN

## 2022-02-24 PROCEDURE — ? PULSED-DYE LASER

## 2022-02-24 ASSESSMENT — LOCATION ZONE DERM
LOCATION ZONE: HAND
LOCATION ZONE: FACE

## 2022-02-24 ASSESSMENT — LOCATION SIMPLE DESCRIPTION DERM
LOCATION SIMPLE: LEFT CHEEK
LOCATION SIMPLE: RIGHT HAND
LOCATION SIMPLE: RIGHT CHEEK

## 2022-02-24 ASSESSMENT — LOCATION DETAILED DESCRIPTION DERM
LOCATION DETAILED: LEFT SUPERIOR LATERAL BUCCAL CHEEK
LOCATION DETAILED: LEFT SUPERIOR NASAL CHEEK
LOCATION DETAILED: RIGHT ULNAR DORSAL HAND
LOCATION DETAILED: RIGHT INFERIOR LATERAL MALAR CHEEK
LOCATION DETAILED: RIGHT LATERAL BUCCAL CHEEK
LOCATION DETAILED: RIGHT RADIAL DORSAL HAND
LOCATION DETAILED: LEFT LATERAL MALAR CHEEK
LOCATION DETAILED: LEFT CENTRAL MANDIBULAR CHEEK

## 2022-02-24 NOTE — PROCEDURE: PULSED-DYE LASER
Cryogen Time (Ms): 10
Laser Type: Vbeam 595nm
Cryogen Time (Ms): [address] [address]
Pulse Duration: 10 ms
Spot Size Override: 11:50
Consent: Written consent obtained, risks reviewed including but not limited to crusting, scabbing, blistering, scarring, darker or lighter pigmentary change, incidental hair removal, bruising, and/or incomplete removal.
Pulse Duration: 6 ms
Fluence In J/Cm2 (Optional): 6.50
Location (Required For Details To Render In Note): lower face
Spot Size: 10 mm
Immediate Endpoint: erythema
Delay Time (Ms): [facility] [facility]
Detail Level: Detailed
Delay Time (Ms): [address]
Location Override: chin area
Spot Size: 10x3 mm
Cryogen Time (Ms): 30
Delay Time (Ms): 20
Post-Care Instructions: I reviewed with the patient in detail post-care instructions. Patient should stay away from the sun and wear sun protection until treated areas are fully healed.
Treated Area: small area
Location Override: cheeks

## 2022-02-24 NOTE — PROCEDURE: BENIGN DESTRUCTION
Include Z78.9 (Other Specified Conditions Influencing Health Status) As An Associated Diagnosis?: No
Post-Care Instructions: I reviewed with the patient in detail post-care instructions. Patient is to wear sunprotection, and avoid picking at any of the treated lesions. Pt may apply Vaseline to crusted or scabbing areas.
Treatment Number (Will Not Render If 0): 0
Detail Level: Detailed
Medical Necessity Clause: This procedure was medically necessary because the lesions that were treated were:
Consent: The patient's consent was obtained including but not limited to risks of crusting, scabbing, blistering, scarring, darker or lighter pigmentary change, recurrence, incomplete removal and infection.
Medical Necessity Information: It is in your best interest to select a reason for this procedure from the list below. All of these items fulfill various CMS LCD requirements except the new and changing color options.
Anesthesia Volume In Cc: 0.3

## 2022-02-24 NOTE — PROCEDURE: LIQUID NITROGEN
Include Z78.9 (Other Specified Conditions Influencing Health Status) As An Associated Diagnosis?: No
Show Topical Anesthesia Variable?: Yes
Spray Paint Text: The liquid nitrogen was applied to the skin utilizing a spray paint frosting technique.
Number Of Freeze-Thaw Cycles: 3 freeze-thaw cycles
Detail Level: Detailed
Medical Necessity Information: It is in your best interest to select a reason for this procedure from the list below. All of these items fulfill various CMS LCD requirements except the new and changing color options.
Duration Of Freeze Thaw-Cycle (Seconds): 10
Medical Necessity Clause: This procedure was medically necessary because the lesions that were treated were:
Post-Care Instructions: I reviewed with the patient in detail post-care instructions. Patient is to wear sunprotection, and avoid picking at any of the treated lesions. Pt may apply Vaseline to crusted or scabbing areas.
Consent: The patient's consent was obtained including but not limited to risks of crusting, scabbing, blistering, scarring, darker or lighter pigmentary change, recurrence, incomplete removal and infection.

## 2022-05-04 ENCOUNTER — APPOINTMENT (RX ONLY)
Dept: URBAN - METROPOLITAN AREA CLINIC 23 | Facility: CLINIC | Age: 52
Setting detail: DERMATOLOGY
End: 2022-05-04

## 2022-05-04 DIAGNOSIS — D22 MELANOCYTIC NEVI: ICD-10-CM

## 2022-05-04 DIAGNOSIS — Z41.9 ENCOUNTER FOR PROCEDURE FOR PURPOSES OTHER THAN REMEDYING HEALTH STATE, UNSPECIFIED: ICD-10-CM

## 2022-05-04 DIAGNOSIS — Z85.828 PERSONAL HISTORY OF OTHER MALIGNANT NEOPLASM OF SKIN: ICD-10-CM

## 2022-05-04 DIAGNOSIS — L82.1 OTHER SEBORRHEIC KERATOSIS: ICD-10-CM

## 2022-05-04 PROBLEM — D22.9 MELANOCYTIC NEVI, UNSPECIFIED: Status: ACTIVE | Noted: 2022-05-04

## 2022-05-04 PROCEDURE — 99213 OFFICE O/P EST LOW 20 MIN: CPT

## 2022-05-04 PROCEDURE — ? COUNSELING

## 2022-05-04 PROCEDURE — ? PULSED-DYE LASER

## 2022-05-04 PROCEDURE — ? SUNSCREEN RECOMMENDATIONS

## 2022-05-04 NOTE — PROCEDURE: PULSED-DYE LASER
Fluence In J/Cm2 (Optional): 8.00
Treated Area: small area
Spot Size: 7 mm
Delay Time (Ms): 10
Post-Care Instructions: I reviewed with the patient in detail post-care instructions. Patient should stay away from the sun and wear sun protection until treated areas are fully healed.
Immediate Endpoint: erythema
Pulse Duration: 1.5 ms
Spot Size: 10x3 mm
Delay Time (Ms): [address]
Fluence In J/Cm2 (Optional): 12.00
Location Override: chin
Pulse Duration: 10 ms
Cryogen Time (Ms): [address] [address]
Detail Level: Detailed
Cryogen Time (Ms): 30
Delay Time (Ms): [address] [facility] [place]
Spot Size: 10 mm
Fluence In J/Cm2 (Optional): 7:00
Treated Area: large area
Laser Type: Vbeam 595nm
Consent: Written consent obtained, risks reviewed including but not limited to crusting, scabbing, blistering, scarring, darker or lighter pigmentary change, incidental hair removal, bruising, and/or incomplete removal.
Delay Time (Ms): 20

## 2022-08-09 ENCOUNTER — RX ONLY (OUTPATIENT)
Age: 52
Setting detail: RX ONLY
End: 2022-08-09

## 2022-08-09 RX ORDER — BIMATOPROST 0.3 MG/ML
SOLUTION/ DROPS OPHTHALMIC
Qty: 5 | Refills: 4 | Status: ERX | COMMUNITY
Start: 2022-08-09

## 2022-08-11 ENCOUNTER — RX ONLY (OUTPATIENT)
Age: 52
Setting detail: RX ONLY
End: 2022-08-11

## 2022-08-11 RX ORDER — BIMATOPROST 0.3 MG/ML
SOLUTION/ DROPS OPHTHALMIC
Qty: 5 | Refills: 4 | Status: ERX

## 2023-05-09 ENCOUNTER — APPOINTMENT (RX ONLY)
Dept: URBAN - METROPOLITAN AREA CLINIC 23 | Facility: CLINIC | Age: 53
Setting detail: DERMATOLOGY
End: 2023-05-09

## 2023-05-09 DIAGNOSIS — L82.0 INFLAMED SEBORRHEIC KERATOSIS: ICD-10-CM

## 2023-05-09 DIAGNOSIS — Z85.828 PERSONAL HISTORY OF OTHER MALIGNANT NEOPLASM OF SKIN: ICD-10-CM

## 2023-05-09 DIAGNOSIS — L72.0 EPIDERMAL CYST: ICD-10-CM

## 2023-05-09 DIAGNOSIS — Z71.89 OTHER SPECIFIED COUNSELING: ICD-10-CM

## 2023-05-09 DIAGNOSIS — L82.1 OTHER SEBORRHEIC KERATOSIS: ICD-10-CM

## 2023-05-09 DIAGNOSIS — D49.2 NEOPLASM OF UNSPECIFIED BEHAVIOR OF BONE, SOFT TISSUE, AND SKIN: ICD-10-CM

## 2023-05-09 PROCEDURE — ? LIQUID NITROGEN

## 2023-05-09 PROCEDURE — 11102 TANGNTL BX SKIN SINGLE LES: CPT | Mod: 59

## 2023-05-09 PROCEDURE — 99213 OFFICE O/P EST LOW 20 MIN: CPT | Mod: 25

## 2023-05-09 PROCEDURE — ? BIOPSY BY SHAVE METHOD

## 2023-05-09 PROCEDURE — ? COUNSELING

## 2023-05-09 PROCEDURE — 17110 DESTRUCTION B9 LES UP TO 14: CPT

## 2023-05-09 PROCEDURE — ? BENIGN DESTRUCTION

## 2023-05-09 PROCEDURE — 11103 TANGNTL BX SKIN EA SEP/ADDL: CPT | Mod: 59

## 2023-05-09 PROCEDURE — ? SUNSCREEN RECOMMENDATIONS

## 2023-05-09 ASSESSMENT — LOCATION ZONE DERM
LOCATION ZONE: HAND
LOCATION ZONE: FACE
LOCATION ZONE: TRUNK

## 2023-05-09 ASSESSMENT — LOCATION DETAILED DESCRIPTION DERM
LOCATION DETAILED: INFERIOR THORACIC SPINE
LOCATION DETAILED: RIGHT INFERIOR CENTRAL MALAR CHEEK
LOCATION DETAILED: RIGHT LATERAL MALAR CHEEK
LOCATION DETAILED: LEFT ULNAR DORSAL HAND
LOCATION DETAILED: LEFT SUPERIOR LATERAL BUCCAL CHEEK
LOCATION DETAILED: RIGHT INFERIOR MEDIAL FOREHEAD
LOCATION DETAILED: 2ND WEB SPACE RIGHT HAND
LOCATION DETAILED: RIGHT RADIAL DORSAL HAND
LOCATION DETAILED: RIGHT SUPERIOR LATERAL BUCCAL CHEEK
LOCATION DETAILED: LEFT MEDIAL SUPERIOR CHEST
LOCATION DETAILED: RIGHT CENTRAL BUCCAL CHEEK
LOCATION DETAILED: LEFT SUPERIOR FOREHEAD
LOCATION DETAILED: LEFT INFERIOR CENTRAL MALAR CHEEK
LOCATION DETAILED: LEFT CENTRAL BUCCAL CHEEK
LOCATION DETAILED: RIGHT MEDIAL SUPERIOR CHEST
LOCATION DETAILED: RIGHT INFERIOR MEDIAL LOWER BACK

## 2023-05-09 ASSESSMENT — LOCATION SIMPLE DESCRIPTION DERM
LOCATION SIMPLE: RIGHT LOWER BACK
LOCATION SIMPLE: LEFT HAND
LOCATION SIMPLE: RIGHT FOREHEAD
LOCATION SIMPLE: LEFT CHEEK
LOCATION SIMPLE: UPPER BACK
LOCATION SIMPLE: RIGHT CHEEK
LOCATION SIMPLE: RIGHT HAND
LOCATION SIMPLE: CHEST
LOCATION SIMPLE: LEFT FOREHEAD

## 2023-05-09 NOTE — PROCEDURE: BIOPSY BY SHAVE METHOD
Detail Level: Detailed
Depth Of Biopsy: dermis
Was A Bandage Applied: Yes
Size Of Lesion In Cm: 0
Biopsy Type: H and E
Biopsy Method: 15 blade
Anesthesia Type: 1% Xylocaine without epinephrine
Anesthesia Volume In Cc (Will Not Render If 0): 1
Hemostasis: Electrocautery
Wound Care: Vaseline
Dressing: bandage
Destruction After The Procedure: No
Type Of Destruction Used: Curettage
Curettage Text: The wound bed was treated with curettage after the biopsy was performed.
Cryotherapy Text: The wound bed was treated with cryotherapy after the biopsy was performed.
Electrodesiccation Text: The wound bed was treated with electrodesiccation after the biopsy was performed.
Electrodesiccation And Curettage Text: The wound bed was treated with electrodesiccation and curettage after the biopsy was performed.
Silver Nitrate Text: The wound bed was treated with silver nitrate after the biopsy was performed.
Consent: The provider's intent is to obtain a tissue sample solely for diagnostic purposes. Written consent to obtain tissue sample was obtained and risks were reviewed including but not limited to scarring, infection, bleeding, scabbing, incomplete removal, nerve damage and allergy to anesthesia.
Post-Care Instructions: I reviewed with the patient in detail post-care instructions. Patient is to keep the biopsy site dry overnight, and then apply bacitracin twice daily until healed. Patient may apply hydrogen peroxide soaks to remove any crusting.
Notification Instructions: Patient will be notified of biopsy results. However, patient instructed to call the office if not contacted within 2 weeks.
Billing Type: [employer]
Information: Selecting Yes will display possible errors in your note based on the variables you have selected. This validation is only offered as a suggestion for you. PLEASE NOTE THAT THE VALIDATION TEXT WILL BE REMOVED WHEN YOU FINALIZE YOUR NOTE. IF YOU WANT TO FAX A PRELIMINARY NOTE YOU WILL NEED TO TOGGLE THIS TO 'NO' IF YOU DO NOT WANT IT IN YOUR FAXED NOTE.
Size Of Lesion In Cm: 0.1
Consent: The provider's intent is to obtain a tissue sample solely for diagnostic purposes.  Written consent to obtain tissue sample was obtained and risks were reviewed including but not limited to scarring, infection, bleeding, scabbing, incomplete removal, nerve damage and allergy to anesthesia.
Billing Type: Third-Party Bill

## 2023-05-09 NOTE — PROCEDURE: LIQUID NITROGEN
Spray Paint Technique: No
Show Spray Paint Technique Variable?: Yes
Duration Of Freeze Thaw-Cycle (Seconds): 10
Spray Paint Text: The liquid nitrogen was applied to the skin utilizing a spray paint frosting technique.
Detail Level: Detailed
Number Of Freeze-Thaw Cycles: 3 freeze-thaw cycles
Consent: The patient's consent was obtained including but not limited to risks of crusting, scabbing, blistering, scarring, darker or lighter pigmentary change, recurrence, incomplete removal and infection.
Medical Necessity Information: It is in your best interest to select a reason for this procedure from the list below. All of these items fulfill various CMS LCD requirements except the new and changing color options.
Post-Care Instructions: I reviewed with the patient in detail post-care instructions. Patient is to wear sunprotection, and avoid picking at any of the treated lesions. Pt may apply Vaseline to crusted or scabbing areas.
Medical Necessity Clause: This procedure was medically necessary because the lesions that were treated were:

## 2023-05-09 NOTE — PROCEDURE: BENIGN DESTRUCTION
Render Note In Bullet Format When Appropriate: No
Medical Necessity Information: It is in your best interest to select a reason for this procedure from the list below. All of these items fulfill various CMS LCD requirements except the new and changing color options.
Consent: The patient's consent was obtained including but not limited to risks of crusting, scabbing, blistering, scarring, darker or lighter pigmentary change, recurrence, incomplete removal and infection.
Treatment Number (Will Not Render If 0): 0
Medical Necessity Clause: This procedure was medically necessary because the lesions that were treated were:
Detail Level: Detailed
Post-Care Instructions: I reviewed with the patient in detail post-care instructions. Patient is to wear sunprotection, and avoid picking at any of the treated lesions. Pt may apply Vaseline to crusted or scabbing areas.
Anesthesia Volume In Cc: 0.3

## 2023-05-10 ENCOUNTER — RX ONLY (OUTPATIENT)
Age: 53
Setting detail: RX ONLY
End: 2023-05-10

## 2023-05-10 RX ORDER — HYDROCORTISONE 25 MG/G
CREAM TOPICAL
Qty: 30 | Refills: 0 | Status: ERX | COMMUNITY
Start: 2023-05-10

## 2025-01-21 ENCOUNTER — APPOINTMENT (OUTPATIENT)
Dept: URBAN - METROPOLITAN AREA CLINIC 23 | Facility: CLINIC | Age: 55
Setting detail: DERMATOLOGY
End: 2025-01-21

## 2025-01-21 DIAGNOSIS — Z41.9 ENCOUNTER FOR PROCEDURE FOR PURPOSES OTHER THAN REMEDYING HEALTH STATE, UNSPECIFIED: ICD-10-CM

## 2025-01-21 PROCEDURE — ? DELUXE HYDRAFACIAL

## 2025-01-21 PROCEDURE — ? DERMAPLANE

## 2025-01-21 ASSESSMENT — LOCATION DETAILED DESCRIPTION DERM: LOCATION DETAILED: LEFT INFERIOR CENTRAL MALAR CHEEK

## 2025-01-21 ASSESSMENT — LOCATION SIMPLE DESCRIPTION DERM: LOCATION SIMPLE: LEFT CHEEK

## 2025-01-21 ASSESSMENT — LOCATION ZONE DERM: LOCATION ZONE: FACE

## 2025-01-21 NOTE — PROCEDURE: DELUXE HYDRAFACIAL
Vacuum Pressure High Setting (Will Not Render If Set To 0): 0
Solution: Beta-HD
Solution Override
Price (Use Numbers Only, No Special Characters Or $): 723
Treatment Number: 1
Tip: Hydropeel Tip, Clear
Tip: Hydropeel Tip, Teal
Consent: Written consent obtained, risks reviewed including but not limited to crusting, scabbing, blistering, scarring, darker or lighter pigmentary change, bruising, and/or incomplete response.
Procedure: Fusion
Solution: Activ-4
Indication: skin texture
Post-Care Instructions: I reviewed with the patient in detail post-care instructions. Patient should stay away from the sun and wear sun protection until treated areas are fully healed.
Procedure: Exfoliation
Procedure: Boost
Tip Override
Location: face
Solution: GlySal 7.5%
Procedure: Extraction
Procedure: Extend and Protect
Tip: Hydropeel Tip, Blue

## 2025-02-11 ENCOUNTER — APPOINTMENT (OUTPATIENT)
Dept: URBAN - METROPOLITAN AREA CLINIC 23 | Facility: CLINIC | Age: 55
Setting detail: DERMATOLOGY
End: 2025-02-11

## 2025-02-11 DIAGNOSIS — Z71.89 OTHER SPECIFIED COUNSELING: ICD-10-CM

## 2025-02-11 DIAGNOSIS — Z41.9 ENCOUNTER FOR PROCEDURE FOR PURPOSES OTHER THAN REMEDYING HEALTH STATE, UNSPECIFIED: ICD-10-CM

## 2025-02-11 DIAGNOSIS — L81.4 OTHER MELANIN HYPERPIGMENTATION: ICD-10-CM

## 2025-02-11 DIAGNOSIS — D49.2 NEOPLASM OF UNSPECIFIED BEHAVIOR OF BONE, SOFT TISSUE, AND SKIN: ICD-10-CM

## 2025-02-11 DIAGNOSIS — L82.1 OTHER SEBORRHEIC KERATOSIS: ICD-10-CM

## 2025-02-11 PROCEDURE — 99213 OFFICE O/P EST LOW 20 MIN: CPT | Mod: 25

## 2025-02-11 PROCEDURE — ? PICOWAY LASER

## 2025-02-11 PROCEDURE — ? BIOPSY BY SHAVE METHOD

## 2025-02-11 PROCEDURE — ? SUNSCREEN RECOMMENDATIONS

## 2025-02-11 PROCEDURE — 11102 TANGNTL BX SKIN SINGLE LES: CPT

## 2025-02-11 PROCEDURE — 11103 TANGNTL BX SKIN EA SEP/ADDL: CPT

## 2025-02-11 PROCEDURE — ? COUNSELING

## 2025-02-11 ASSESSMENT — LOCATION DETAILED DESCRIPTION DERM
LOCATION DETAILED: INFERIOR THORACIC SPINE
LOCATION DETAILED: LEFT LATERAL DORSAL FOOT
LOCATION DETAILED: LEFT LATERAL BREAST 3-4:00 REGION
LOCATION DETAILED: RIGHT INFERIOR MEDIAL FOREHEAD

## 2025-02-11 ASSESSMENT — LOCATION ZONE DERM
LOCATION ZONE: FACE
LOCATION ZONE: TRUNK
LOCATION ZONE: FEET

## 2025-02-11 ASSESSMENT — LOCATION SIMPLE DESCRIPTION DERM
LOCATION SIMPLE: RIGHT FOREHEAD
LOCATION SIMPLE: LEFT FOOT
LOCATION SIMPLE: UPPER BACK
LOCATION SIMPLE: LEFT BREAST

## 2025-02-11 NOTE — PROCEDURE: PICOWAY LASER
Frequency (Hz): 6 Hz
Fluence (J/Cm2): 1.7
Wavelength: 1064 nm
Handpiece: Resolve
Spot Size: 6.0 mm x 6.0 mm
Treatment Number: 1
Post-Care Instructions: I reviewed with the patient in detail post-care instructions. Patient should avoid sun for a minimum of 4 weeks before and after treatment.
External Cooling Fan Speed: 0
Fluence (J/Cm2): 0.5
Price (Use Numbers Only, No Special Characters Or $): 870
Hide Pulse Duration?: No
Handpiece: Zoom
Wavelength: 532 nm
Location Override: brown spots legs
Fluence (J/Cm2): 1.9
Detail Level: Zone
Frequency (Hz): 2
Pre-Procedure: Prior to proceeding the treatment areas were cleaned and all present put on their eye protection.
Consent: Written consent obtained, risks reviewed including but not limited to crusting, scabbing, blistering, scarring, darker or lighter pigmentary change, paradoxical hair regrowth, incomplete removal of hair and infection.
Spot Size: 4.0 mm
Fluence (J/Cm2): 0.6
Post-Procedure Care: Immediate endpoint: perifollicular erythema and edema. Vaseline and ice applied. Post care reviewed with patient.

## 2025-02-11 NOTE — PROCEDURE: BIOPSY BY SHAVE METHOD
Detail Level: Detailed
Depth Of Biopsy: dermis
Was A Bandage Applied: Yes
Size Of Lesion In Cm: 0
Biopsy Type: H and E
Biopsy Method: 15 blade
Anesthesia Type: 1% lidocaine without epinephrine
Anesthesia Volume In Cc: 0.4
Hemostasis: Aluminum Chloride
Wound Care: Mupirocin
Dressing: Band-Aid
Destruction After The Procedure: No
Type Of Destruction Used: Curettage
Curettage Text: The wound bed was treated with curettage after the biopsy was performed.
Cryotherapy Text: The wound bed was treated with cryotherapy after the biopsy was performed.
Electrodesiccation Text: The wound bed was treated with electrodesiccation after the biopsy was performed.
Electrodesiccation And Curettage Text: The wound bed was treated with electrodesiccation and curettage after the biopsy was performed.
Silver Nitrate Text: The wound bed was treated with silver nitrate after the biopsy was performed.
Lab: [id number]
Medical Necessity Information: It is in your best interest to select a reason for this procedure from the list below. All of these items fulfill various CMS LCD requirements except the new and changing color options.
Consent: The provider's intent is to obtain a tissue sample solely for diagnostic purposes.  Written consent to obtain tissue sample was obtained and risks were reviewed including but not limited to scarring, infection, bleeding, scabbing, incomplete removal, nerve damage and allergy to anesthesia.
Post-Care Instructions: I reviewed with the patient in detail post-care instructions. Patient is to keep the biopsy site dry overnight, and then apply bacitracin twice daily until healed. Patient may apply hydrogen peroxide soaks to remove any crusting.
Notification Instructions: Patient will be notified of biopsy results. However, patient instructed to call the office if not contacted within 2 weeks.
Billing Type: Third-Party Bill
Information: Selecting Yes will display possible errors in your note based on the variables you have selected. This validation is only offered as a suggestion for you. PLEASE NOTE THAT THE VALIDATION TEXT WILL BE REMOVED WHEN YOU FINALIZE YOUR NOTE. IF YOU WANT TO FAX A PRELIMINARY NOTE YOU WILL NEED TO TOGGLE THIS TO 'NO' IF YOU DO NOT WANT IT IN YOUR FAXED NOTE.

## 2025-03-14 ENCOUNTER — APPOINTMENT (OUTPATIENT)
Dept: URBAN - METROPOLITAN AREA CLINIC 23 | Facility: CLINIC | Age: 55
Setting detail: DERMATOLOGY
End: 2025-03-14

## 2025-03-14 DIAGNOSIS — Z41.9 ENCOUNTER FOR PROCEDURE FOR PURPOSES OTHER THAN REMEDYING HEALTH STATE, UNSPECIFIED: ICD-10-CM

## 2025-03-14 PROCEDURE — ? DERMAPLANE

## 2025-03-14 PROCEDURE — ? DELUXE HYDRAFACIAL

## 2025-03-14 ASSESSMENT — LOCATION ZONE DERM: LOCATION ZONE: FACE

## 2025-03-14 ASSESSMENT — LOCATION DETAILED DESCRIPTION DERM: LOCATION DETAILED: LEFT INFERIOR CENTRAL MALAR CHEEK

## 2025-03-14 ASSESSMENT — LOCATION SIMPLE DESCRIPTION DERM: LOCATION SIMPLE: LEFT CHEEK

## 2025-03-14 NOTE — PROCEDURE: DELUXE HYDRAFACIAL
Vacuum Pressure High Setting (Will Not Render If Set To 0): 0
Tip: Hydropeel Tip, Blue
Indication: skin texture
Tip Override
Procedure: Exfoliation
Solution: Beta-HD
Tip: Hydropeel Tip, Clear
Consent: Written consent obtained, risks reviewed including but not limited to crusting, scabbing, blistering, scarring, darker or lighter pigmentary change, bruising, and/or incomplete response.
Tip: Hydropeel Tip, Teal
Procedure: Boost
Procedure: Extend and Protect
Location: face
Post-Care Instructions: I reviewed with the patient in detail post-care instructions. Patient should stay away from the sun and wear sun protection until treated areas are fully healed.
Solution: Activ-4
Solution Override
Procedure: Fusion
Solution: GlySal 7.5%
Price (Use Numbers Only, No Special Characters Or $): 972
Treatment Number: 1
Procedure: Extraction

## 2025-03-14 NOTE — PROCEDURE: DERMAPLANE
Treatment Area Prep: alcohol
Price (Use Numbers Only, No Special Characters Or $): 100
Blade: 10 blade scalpel
Post-Care Instructions: I reviewed with the patient in detail post-care instructions.
Pre-Procedure Text: The patient was placed in a recumbant position on the procedure table.
Post-Procedure Instructions: Following the dermaplane procedure, Oxymist treatment was applied to the treatment areas. Moisturizer and SPF was applied.
Treatment Areas: face
Detail Level: Zone

## 2025-04-23 ENCOUNTER — APPOINTMENT (OUTPATIENT)
Dept: URBAN - METROPOLITAN AREA CLINIC 23 | Facility: CLINIC | Age: 55
Setting detail: DERMATOLOGY
End: 2025-04-23

## 2025-04-23 DIAGNOSIS — Z41.9 ENCOUNTER FOR PROCEDURE FOR PURPOSES OTHER THAN REMEDYING HEALTH STATE, UNSPECIFIED: ICD-10-CM

## 2025-04-23 PROCEDURE — ? PICOWAY LASER

## 2025-04-23 PROCEDURE — ? VBEAM PERFECTA LASER

## 2025-04-23 PROCEDURE — ? ADDITIONAL NOTES

## 2025-04-23 NOTE — PROCEDURE: ADDITIONAL NOTES
Additional Notes: Pt advised brown spots are seborrheic keratoses\\nFrax neck $550\\nSof wave neck $2650\\nCool peel
Detail Level: Zone
Render Risk Assessment In Note?: no

## 2025-04-23 NOTE — PROCEDURE: PICOWAY LASER
Treatment Number: 2
Treatment Number: 1
External Cooling Fan Speed: 0
Hide Pulse Duration?: No
Spot Size: 4.0 mm
Handpiece: Resolve
Handpiece: Zoom
Fluence (J/Cm2): 0.7
Price (Use Numbers Only, No Special Characters Or $): 071
Fluence (J/Cm2): 0.6
Fluence (J/Cm2): 1.9
Frequency (Hz): 6 Hz
Consent: Written consent obtained, risks reviewed including but not limited to crusting, scabbing, blistering, scarring, darker or lighter pigmentary change, paradoxical hair regrowth, incomplete removal of hair and infection.
Wavelength: 1064 nm
Location Override: forearms test spot
Pre-Procedure: Prior to proceeding the treatment areas were cleaned and all present put on their eye protection.
Detail Level: Zone
Wavelength: 532 nm
Frequency (Hz): 2Hz
Spot Size: 6.0 mm x 6.0 mm
Post-Care Instructions: I reviewed with the patient in detail post-care instructions. Patient should avoid sun for a minimum of 4 weeks before and after treatment.
Post-Procedure Care: Immediate endpoint: perifollicular erythema and edema. Vaseline and ice applied. Post care reviewed with patient.